# Patient Record
Sex: FEMALE | Race: WHITE
[De-identification: names, ages, dates, MRNs, and addresses within clinical notes are randomized per-mention and may not be internally consistent; named-entity substitution may affect disease eponyms.]

---

## 2018-09-26 ENCOUNTER — HOSPITAL ENCOUNTER (OUTPATIENT)
Dept: HOSPITAL 35 - PAIN | Age: 63
End: 2018-09-26
Payer: COMMERCIAL

## 2018-09-26 VITALS — DIASTOLIC BLOOD PRESSURE: 86 MMHG | SYSTOLIC BLOOD PRESSURE: 110 MMHG

## 2018-09-26 VITALS — HEIGHT: 62.01 IN | WEIGHT: 188.6 LBS | BODY MASS INDEX: 34.27 KG/M2

## 2018-09-26 DIAGNOSIS — M79.642: ICD-10-CM

## 2018-09-26 DIAGNOSIS — Z79.899: ICD-10-CM

## 2018-09-26 DIAGNOSIS — M79.641: ICD-10-CM

## 2018-09-26 DIAGNOSIS — M54.5: Primary | ICD-10-CM

## 2018-09-26 DIAGNOSIS — M54.2: ICD-10-CM

## 2018-10-26 ENCOUNTER — HOSPITAL ENCOUNTER (OUTPATIENT)
Dept: HOSPITAL 35 - PAIN | Age: 63
Discharge: HOME | End: 2018-10-26
Payer: COMMERCIAL

## 2018-10-26 VITALS — BODY MASS INDEX: 33.69 KG/M2 | WEIGHT: 185.4 LBS | HEIGHT: 62.01 IN

## 2018-10-26 VITALS — SYSTOLIC BLOOD PRESSURE: 114 MMHG | DIASTOLIC BLOOD PRESSURE: 89 MMHG

## 2018-10-26 DIAGNOSIS — J45.909: ICD-10-CM

## 2018-10-26 DIAGNOSIS — F17.210: ICD-10-CM

## 2018-10-26 DIAGNOSIS — Z88.8: ICD-10-CM

## 2018-10-26 DIAGNOSIS — Z96.643: ICD-10-CM

## 2018-10-26 DIAGNOSIS — Z79.899: ICD-10-CM

## 2018-10-26 DIAGNOSIS — I10: ICD-10-CM

## 2018-10-26 DIAGNOSIS — Z87.19: ICD-10-CM

## 2018-10-26 DIAGNOSIS — G89.29: ICD-10-CM

## 2018-10-26 DIAGNOSIS — M79.644: Primary | ICD-10-CM

## 2018-10-26 DIAGNOSIS — M19.90: ICD-10-CM

## 2018-10-26 DIAGNOSIS — Z98.890: ICD-10-CM

## 2018-11-30 ENCOUNTER — HOSPITAL ENCOUNTER (OUTPATIENT)
Dept: HOSPITAL 35 - PAIN | Age: 63
End: 2018-11-30
Payer: COMMERCIAL

## 2018-11-30 VITALS — DIASTOLIC BLOOD PRESSURE: 96 MMHG | SYSTOLIC BLOOD PRESSURE: 147 MMHG

## 2018-11-30 VITALS — HEIGHT: 62.01 IN | WEIGHT: 183.6 LBS | BODY MASS INDEX: 33.36 KG/M2

## 2018-11-30 DIAGNOSIS — M54.2: Primary | ICD-10-CM

## 2018-11-30 DIAGNOSIS — Z79.891: ICD-10-CM

## 2018-11-30 DIAGNOSIS — M25.511: ICD-10-CM

## 2018-11-30 DIAGNOSIS — M25.512: ICD-10-CM

## 2018-11-30 DIAGNOSIS — Z79.899: ICD-10-CM

## 2019-01-18 ENCOUNTER — HOSPITAL ENCOUNTER (OUTPATIENT)
Dept: HOSPITAL 35 - PAIN | Age: 64
End: 2019-01-18
Payer: COMMERCIAL

## 2019-01-18 VITALS — DIASTOLIC BLOOD PRESSURE: 92 MMHG | SYSTOLIC BLOOD PRESSURE: 138 MMHG

## 2019-01-18 VITALS — WEIGHT: 185 LBS | BODY MASS INDEX: 32.78 KG/M2 | HEIGHT: 62.99 IN

## 2019-01-18 DIAGNOSIS — M54.2: Primary | ICD-10-CM

## 2019-01-18 DIAGNOSIS — M25.512: ICD-10-CM

## 2019-01-18 DIAGNOSIS — M25.511: ICD-10-CM

## 2019-01-18 DIAGNOSIS — Z79.899: ICD-10-CM

## 2019-01-18 NOTE — NUR
Pain Clinic Assessment:
 
1. History of Osteoarthritis:
EVERYWHERE
   History of Rheumatoid Arthritis:
Not Applicable
 
2. Height: 5 ft. 3 in. 160.0 cm.
   Weight: 185.0 lb.  oz. 83.916 kg.
   Patient's BMI: 32.8
 
3. Vital Signs:
   BP: 138/92 Pulse: 106 Resp: 16
   Temp:  02 Sat: 95 ECG Mon:
 
4. Pain Intensity: 8
 
5. Fall Risk:
   Dizziness: N  Needs help standing or walking: N
   Fallen in the last 3 months: N
   Fall risk comments:
 
 
6. Patient on Blood Thinner: None
 
7. History of Hypertension: N
 
8. Opioid Therapy greater than 6 weeks: Y
   Opiate Contract Signed:
 
9. Risk Assessment Tool Provided: HIGH
 
10. Functional Assessment Tool: 48/70
 
11. Recreational Drug Use: Never Drug Type:
    Tobacco Use: Current Every Day Smoker Tobacco Type:
       Amount or Packs/day:  How Many Years:
    Alcohol Use: No  Frequency:  Quant:

## 2019-02-20 ENCOUNTER — HOSPITAL ENCOUNTER (OUTPATIENT)
Dept: HOSPITAL 35 - PAIN | Age: 64
End: 2019-02-20
Payer: COMMERCIAL

## 2019-02-20 VITALS — DIASTOLIC BLOOD PRESSURE: 78 MMHG | SYSTOLIC BLOOD PRESSURE: 116 MMHG

## 2019-02-20 VITALS — BODY MASS INDEX: 32.78 KG/M2 | WEIGHT: 185 LBS | HEIGHT: 62.99 IN

## 2019-02-20 DIAGNOSIS — Z79.899: ICD-10-CM

## 2019-02-20 DIAGNOSIS — M79.641: ICD-10-CM

## 2019-02-20 DIAGNOSIS — M54.2: Primary | ICD-10-CM

## 2019-02-20 DIAGNOSIS — M79.642: ICD-10-CM

## 2019-02-20 DIAGNOSIS — F17.200: ICD-10-CM

## 2019-02-20 DIAGNOSIS — M25.511: ICD-10-CM

## 2019-02-20 DIAGNOSIS — M25.512: ICD-10-CM

## 2019-02-20 NOTE — NUR
Pain Clinic Assessment:
 
1. History of Osteoarthritis:
EVERYWHERE
   History of Rheumatoid Arthritis:
Not Applicable
 
2. Height: 5 ft. 3 in. 160.0 cm.
   Weight: 185.0 lb.  oz. 83.916 kg.
   Patient's BMI: 32.8
 
3. Vital Signs:
   BP: 116/78 Pulse: 88 Resp: 16
   Temp:  02 Sat: 97 ECG Mon:
 
4. Pain Intensity: 5
 
5. Fall Risk:
   Dizziness: N  Needs help standing or walking: N
   Fallen in the last 3 months: N
   Fall risk comments:
 
 
6. Patient on Blood Thinner: None
 
7. History of Hypertension: N
 
8. Opioid Therapy greater than 6 weeks: Y
   Opiate Contract Signed:
 
9. Risk Assessment Tool Provided: HIGH
 
10. Functional Assessment Tool: 48/70
 
11. Recreational Drug Use: Never Drug Type:
    Tobacco Use: Current Every Day Smoker Tobacco Type:
       Amount or Packs/day:  How Many Years:
    Alcohol Use: No  Frequency:  Quant:

## 2019-03-21 ENCOUNTER — HOSPITAL ENCOUNTER (OUTPATIENT)
Dept: HOSPITAL 35 - PAIN | Age: 64
End: 2019-03-21
Attending: CLINICAL NURSE SPECIALIST
Payer: COMMERCIAL

## 2019-03-21 VITALS — HEIGHT: 62.99 IN | BODY MASS INDEX: 32.89 KG/M2 | WEIGHT: 185.6 LBS

## 2019-03-21 VITALS — SYSTOLIC BLOOD PRESSURE: 142 MMHG | DIASTOLIC BLOOD PRESSURE: 90 MMHG

## 2019-03-21 DIAGNOSIS — J45.909: ICD-10-CM

## 2019-03-21 DIAGNOSIS — M65.4: ICD-10-CM

## 2019-03-21 DIAGNOSIS — I10: ICD-10-CM

## 2019-03-21 DIAGNOSIS — M79.644: Primary | ICD-10-CM

## 2019-03-21 DIAGNOSIS — M19.90: ICD-10-CM

## 2019-03-21 DIAGNOSIS — K63.9: ICD-10-CM

## 2019-03-21 DIAGNOSIS — F42.9: ICD-10-CM

## 2019-03-21 NOTE — NUR
Pain Clinic Assessment:
 
1. History of Osteoarthritis:
EVERYWHERE
   History of Rheumatoid Arthritis:
Not Applicable
 
2. Height: 5 ft. 3 in. 160.0 cm.
   Weight: 185.6 lb.  oz. 84.188 kg.
   Patient's BMI: 32.9
 
3. Vital Signs:
   BP: 142/90 Pulse: 92 Resp: 16
   Temp:  02 Sat: 96 ECG Mon:
 
4. Pain Intensity: 8
 
5. Fall Risk:
   Dizziness: N  Needs help standing or walking: N
   Fallen in the last 3 months: N
   Fall risk comments:
 
 
6. Patient on Blood Thinner: None
 
7. History of Hypertension: N
 
8. Opioid Therapy greater than 6 weeks: Y
   Opiate Contract Signed: 09/26/18
 
9. Risk Assessment Tool Provided: HIGH
 
10. Functional Assessment Tool: 48/70
 
11. Recreational Drug Use: Never Drug Type:
    Tobacco Use: Current Every Day Smoker Tobacco Type: Cigarettes
       Amount or Packs/day: 3/4 PPD How Many Years: 40
    Alcohol Use: No  Frequency:  Quant:

## 2019-04-18 ENCOUNTER — HOSPITAL ENCOUNTER (OUTPATIENT)
Dept: HOSPITAL 35 - PAIN | Age: 64
End: 2019-04-18
Attending: CLINICAL NURSE SPECIALIST
Payer: COMMERCIAL

## 2019-04-18 VITALS — HEIGHT: 62.99 IN | WEIGHT: 183.4 LBS | BODY MASS INDEX: 32.5 KG/M2

## 2019-04-18 VITALS — SYSTOLIC BLOOD PRESSURE: 119 MMHG | DIASTOLIC BLOOD PRESSURE: 85 MMHG

## 2019-04-18 DIAGNOSIS — F42.9: ICD-10-CM

## 2019-04-18 DIAGNOSIS — K59.9: ICD-10-CM

## 2019-04-18 DIAGNOSIS — K82.9: ICD-10-CM

## 2019-04-18 DIAGNOSIS — M25.562: ICD-10-CM

## 2019-04-18 DIAGNOSIS — Z79.899: ICD-10-CM

## 2019-04-18 DIAGNOSIS — I10: ICD-10-CM

## 2019-04-18 DIAGNOSIS — M19.90: Primary | ICD-10-CM

## 2019-04-18 DIAGNOSIS — E34.51: ICD-10-CM

## 2019-04-18 DIAGNOSIS — J45.909: ICD-10-CM

## 2019-04-18 NOTE — NUR
Pain Clinic Assessment:
 
1. History of Osteoarthritis:
EVERYWHERE
   History of Rheumatoid Arthritis:
Not Applicable
 
2. Height: 5 ft. 3 in. 160.0 cm.
   Weight: 183.4 lb.  oz. 83.190 kg.
   Patient's BMI: 32.5
 
3. Vital Signs:
   BP: 119/85 Pulse: 83 Resp: 16
   Temp:  02 Sat: 98 ECG Mon:
 
4. Pain Intensity: 7
 
5. Fall Risk:
   Dizziness: N  Needs help standing or walking: N
   Fallen in the last 3 months: N
   Fall risk comments:
 
 
6. Patient on Blood Thinner: None
 
7. History of Hypertension: N
 
8. Opioid Therapy greater than 6 weeks: Y
   Opiate Contract Signed: 09/26/18
 
9. Risk Assessment Tool Provided: HIGH
 
10. Functional Assessment Tool: 48/70
 
11. Recreational Drug Use: Never Drug Type:
    Tobacco Use: Current Every Day Smoker Tobacco Type: Cigarettes
       Amount or Packs/day: 1 ppd How Many Years:
    Alcohol Use: No  Frequency:  Quant: